# Patient Record
Sex: FEMALE | Race: WHITE | NOT HISPANIC OR LATINO | Employment: FULL TIME | URBAN - METROPOLITAN AREA
[De-identification: names, ages, dates, MRNs, and addresses within clinical notes are randomized per-mention and may not be internally consistent; named-entity substitution may affect disease eponyms.]

---

## 2022-03-14 ENCOUNTER — TELEPHONE (OUTPATIENT)
Dept: SLEEP CENTER | Facility: CLINIC | Age: 57
End: 2022-03-14

## 2022-04-18 ENCOUNTER — OFFICE VISIT (OUTPATIENT)
Dept: SLEEP CENTER | Facility: CLINIC | Age: 57
End: 2022-04-18

## 2022-04-18 VITALS
DIASTOLIC BLOOD PRESSURE: 66 MMHG | HEIGHT: 64 IN | BODY MASS INDEX: 21 KG/M2 | WEIGHT: 123 LBS | HEART RATE: 83 BPM | SYSTOLIC BLOOD PRESSURE: 138 MMHG

## 2022-04-18 DIAGNOSIS — G47.33 OBSTRUCTIVE SLEEP APNEA (ADULT) (PEDIATRIC): ICD-10-CM

## 2022-04-18 DIAGNOSIS — F51.3 SLEEPWALKING (SOMNAMBULISM): ICD-10-CM

## 2022-04-18 DIAGNOSIS — G47.411 NARCOLEPSY WITH CATAPLEXY: Primary | ICD-10-CM

## 2022-04-18 DIAGNOSIS — R41.3 MEMORY LOSS: ICD-10-CM

## 2022-04-18 PROBLEM — Z86.39 HISTORY OF VITAMIN D DEFICIENCY: Status: ACTIVE | Noted: 2020-08-27

## 2022-04-18 PROBLEM — I49.3 PREMATURE VENTRICULAR CONTRACTIONS: Status: ACTIVE | Noted: 2019-04-23

## 2022-04-18 PROBLEM — M54.2 CHRONIC NECK PAIN: Status: ACTIVE | Noted: 2020-08-27

## 2022-04-18 PROBLEM — D12.6 ADENOMATOUS POLYP OF COLON: Status: ACTIVE | Noted: 2019-04-23

## 2022-04-18 PROBLEM — Z14.01 HEMOPHILIA CARRIER: Status: ACTIVE | Noted: 2022-04-18

## 2022-04-18 PROBLEM — M85.88 OSTEOPENIA OF LUMBAR SPINE: Status: ACTIVE | Noted: 2020-08-27

## 2022-04-18 PROBLEM — Z79.899 ON STIMULANT MEDICATION: Status: ACTIVE | Noted: 2020-08-27

## 2022-04-18 PROBLEM — G89.29 CHRONIC NECK PAIN: Status: ACTIVE | Noted: 2020-08-27

## 2022-04-18 PROBLEM — Z79.899 LONG TERM PRESCRIPTION BENZODIAZEPINE USE: Status: ACTIVE | Noted: 2020-08-27

## 2022-04-18 PROBLEM — R04.0 EPISTAXIS: Status: ACTIVE | Noted: 2022-04-18

## 2022-04-18 PROBLEM — R04.0 EPISTAXIS: Status: RESOLVED | Noted: 2022-04-18 | Resolved: 2022-04-18

## 2022-04-18 PROBLEM — M54.12 CERVICAL RADICULOPATHY AT C5: Status: ACTIVE | Noted: 2020-08-27

## 2022-04-18 PROCEDURE — 99215 OFFICE O/P EST HI 40 MIN: CPT | Performed by: PSYCHIATRY & NEUROLOGY

## 2022-04-18 RX ORDER — MULTIVITAMIN WITH FOLIC ACID 400 MCG
1 TABLET ORAL DAILY
COMMUNITY

## 2022-04-18 RX ORDER — CLONAZEPAM 1 MG/1
1 TABLET ORAL DAILY
COMMUNITY
Start: 2022-04-15

## 2022-04-18 RX ORDER — DEXTROAMPHETAMINE SACCHARATE, AMPHETAMINE ASPARTATE, DEXTROAMPHETAMINE SULFATE AND AMPHETAMINE SULFATE 5; 5; 5; 5 MG/1; MG/1; MG/1; MG/1
20 TABLET ORAL 3 TIMES DAILY
COMMUNITY
Start: 2021-11-15 | End: 2022-04-18 | Stop reason: SDUPTHER

## 2022-04-18 RX ORDER — DEXTROAMPHETAMINE SACCHARATE, AMPHETAMINE ASPARTATE, DEXTROAMPHETAMINE SULFATE AND AMPHETAMINE SULFATE 5; 5; 5; 5 MG/1; MG/1; MG/1; MG/1
TABLET ORAL
Qty: 270 TABLET | Refills: 0 | Status: SHIPPED | OUTPATIENT
Start: 2022-04-18 | End: 2022-06-27 | Stop reason: SDUPTHER

## 2022-04-18 RX ORDER — MELOXICAM 15 MG/1
15 TABLET ORAL AS NEEDED
COMMUNITY
Start: 2022-03-20

## 2022-04-18 NOTE — PROGRESS NOTES
Assessment/Plan:      1  Narcolepsy with cataplexy  Assessment & Plan:  Ms Alicja Da Silva has been doing well on her current regimen for the most part  She is able to maintain alertness and does not have side effects on Adderall  We reviewed that if a nonstimulant was effective, that certainly would be ideal from a long-term health perspective  In the past, modafinil did not provide at a benefit, but she has not tried [place] or [place]  We discussed that on off days, she could skip the afternoon dose of Adderall or even lower the morning dose to 30 mg as on the she is able to control sleepiness by napping and also avoiding drowsy driving  I renewed the medication today  She notes a negative workup with Cardiology approximately 5 years ago  Although she is asymptomatic, it would be reasonable for her to have a reassessment to make sure no changes on echo have emerged  She wishes to pursue this closer to home  Orders:  -     amphetamine-dextroamphetamine (ADDERALL) 20 mg tablet; Take 2 pills in the morning and 1 pill in the afternoon  Afternoon dose is as needed    2  Memory loss  Comments:  She has concerns regarding this  Previous brain MRI was negative  I ordered a serum B12 and TSH today  Also recommend Neurology assessment  Orders:  -     Vitamin B12; Future  -     TSH, 3rd generation with T4 reflex; Future  -     Vitamin B12  -     TSH, 3rd generation with T4 reflex    3  Sleepwalking (somnambulism)  Assessment & Plan: On clonazepam which seems mostly effective  Because of sleepwalking, use of Xywav would not be ideal       4  Obstructive sleep apnea (adult) (pediatric)  Comments: This was attached to her referral to me  I do not suspect obstructive sleep apnea nor does she have this condition  Orders:  -     Ambulatory Referral to Sleep Medicine         Subjective:      Patient ID: Lane Fernando is a 64 y o  female  Ms Alicja Da Silva presents to transfer care for her history of narcolepsy and cataplexy    She previously was a patient of mine in [facility]  I last saw her in December 2021, she was doing well at that visit  She has been on a stable dose of Adderall for many years, has been on Adderall since at least 2007  She has no concerns since her last visit  One question she had is if Adderall "would shorten my lifespan"  She had seen cardiology several years ago for palpations and reports a negative workup  These are controlled  Shares a bed with her   Takes clonazepam 1 mg at night  Has not had recent sleepwalking, last happened 1-2 years ago  She has been taking Adderall 40 mg on waking  Then returns to sleep and wakes up after another 30 minutes  At 130-2 PM she takes an extra Adderall 20 mg  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  She reports a bedtime of 10:30 p m , is asleep by 10 40  She is awake at 6:45 a m  Bernadine Console She wakes 3-4 times during the night, sometimes to urinate but returns to sleep easily  She is not aware of snoring, gasping, or witnessed apnea  She has a history of sleep walking, has clonazepam 1 mg prescribed  She drinks 8 oz of caffeinated coffee a day  She drinks alcohol a few times during the week, 1-2 glasses  When she first awakens she is not usually refreshed  Around 11 AM and afternoon she feels sleepy   Has had two times of dozing at work since December  No recent drowsy driving  No sleep paralysis recently  No sleep hallucination recently  These only occur with sleep deprivation  She has rare cataplexy episodes, feels a minor jolt of her muscles caused by laughter or falling asleep  Prior test-  PSG/MSLT at [facility], rem latency 84 min, fragmented sleep, normal AHI  MSLT- mean latency to sleep 3 1 min   REM on 4/5 naps    Previous meds-   1999- presented to Dr Val King on Ritalin, was ineffective  2000- Provigil added, this was stopped between 4026-1015  Has been on Adderall since at least 2007 [place] Sleepiness Scale:     Sitting and reading: Moderate chance of dozing  Watching TV: Moderate chance of dozing  Sitting, inactive in a public place (e g  a theatre or a meeting): Moderate chance of dozing  As a passenger in a car for an hour without a break: High chance of dozing  Lying down to rest in the afternoon when circumstances permit: High chance of dozing  Sitting and talking to someone: Slight chance of dozing  Sitting quietly after a lunch without alcohol: Moderate chance of dozing  In a car, while stopped for a few minutes in traffic: Would never doze  Total score: 15     The following portions of the patient's history were reviewed and updated as appropriate: allergies, current medications, past family history, past medical history, past social history, past surgical history, and problem list     Review of Systems    Genitourinary post menopausal (no peroids)   Cardiology none   Gastrointestinal none   Neurology forgetfulness, poor concentration or confusion,  and difficulty with memory   Constitutional none   Integumentary none   Psychiatry none   Musculoskeletal none   Pulmonary none   ENT none   Endocrine none   Hematological none       Objective:  Neck Circumference: 12 inches      /66 (BP Location: Left arm, Patient Position: Sitting, Cuff Size: Adult)   Pulse 83   Ht 5' 4" (1 626 m)   Wt 55 8 kg (123 lb)   BMI 21 11 kg/m²          Physical Exam  Constitutional:       Appearance: Normal appearance  HENT:      Head: Normocephalic and atraumatic  Mouth/Throat:      Mouth: Mucous membranes are moist    Eyes:      Extraocular Movements: Extraocular movements intact  Pupils: Pupils are equal, round, and reactive to light  Cardiovascular:      Rate and Rhythm: Normal rate  Pulses: Normal pulses  Heart sounds: Normal heart sounds  Pulmonary:      Effort: Pulmonary effort is normal       Breath sounds: Normal breath sounds     Musculoskeletal:      Right lower leg: No edema  Left lower leg: No edema  Neurological:      Mental Status: She is alert  Psychiatric:         Mood and Affect: Mood normal          Behavior: Behavior normal          Thought Content: Thought content normal          Judgment: Judgment normal        I spent 43 minutes on the day of the patient's visit in chart review, face-to-face with the patient, and in documentation

## 2022-04-18 NOTE — PATIENT INSTRUCTIONS
As we discussed, if you want, we can try Wakix or Sunosi as an adjuct with the goal to lower the Adderall dose    It is very important to avoid driving while drowsy, this can be very dangerous or even cause serious injury or death  If sleepy, it is not safe to get behind the wheel  If you are driving and feels sleepy, it is very important to pull over right away  Even losing control of the car for a split second can be deadly  If you feel you cannot control when sleepiness occurs and cannot prevented, it is important to not drive at all until this improves  Please let me know if you experience this as it is very important  I agree it would make sense to see cardiology again to make sure they have no concerns    Also, for your concern regarding memory loss, I would recommend seeing a general neurologist     Also, continue to follow safety measures for sleepwalking

## 2022-04-18 NOTE — ASSESSMENT & PLAN NOTE
On clonazepam which seems mostly effective    Because of sleepwalking, use of Xywav would not be ideal

## 2022-04-18 NOTE — PROGRESS NOTES
Review of Systems      Genitourinary post menopausal (no peroids)   Cardiology none   Gastrointestinal none   Neurology forgetfulness, poor concentration or confusion,  and difficulty with memory   Constitutional none   Integumentary none   Psychiatry none   Musculoskeletal none   Pulmonary none   ENT none   Endocrine none   Hematological none

## 2022-04-18 NOTE — ASSESSMENT & PLAN NOTE
Ms Sravan Russell has been doing well on her current regimen for the most part  She is able to maintain alertness and does not have side effects on Adderall  We reviewed that if a nonstimulant was effective, that certainly would be ideal from a long-term health perspective  In the past, modafinil did not provide at a benefit, but she has not tried [place] or [place]  We discussed that on off days, she could skip the afternoon dose of Adderall or even lower the morning dose to 30 mg as on the she is able to control sleepiness by napping and also avoiding drowsy driving  I renewed the medication today  She notes a negative workup with Cardiology approximately 5 years ago  Although she is asymptomatic, it would be reasonable for her to have a reassessment to make sure no changes on echo have emerged  She wishes to pursue this closer to home

## 2022-06-27 DIAGNOSIS — G47.411 NARCOLEPSY WITH CATAPLEXY: ICD-10-CM

## 2022-06-28 NOTE — TELEPHONE ENCOUNTER
I called the patient to clarify her medication status  I reviewed PDMP which shows that she filled a 90 day supply of Adderall on February 9th and then a 90 day supply of this medication on April 25th  Therefore, I believe she should have at least another month left on the medication, I just want to clarify    I left a message on her voicemail

## 2022-06-29 ENCOUNTER — TELEPHONE (OUTPATIENT)
Dept: SLEEP CENTER | Facility: CLINIC | Age: 57
End: 2022-06-29

## 2022-06-29 NOTE — TELEPHONE ENCOUNTER
----- Message from Perfecto Marie sent at 6/29/2022  6:52 AM EDT -----  Regarding: Adderall Refill  Hi DR Cordova,  Everything is well  I completed my physical with Sánchez Alonzo NP  and had bloodwork done  I have labs still to be drawn that you wanted  I plan to have them done next Thursday or Friday when I am off from work  Would you please send my 3-month refill to my [facility] pharmacy that is in my chart? I don't want to run out of medication  I received your message yesterday and returned your call  I was working and could not take your call while in patient care  Please let me know when you have sent my prescription refill    Thank you so much,  Min Hameed

## 2022-06-30 RX ORDER — DEXTROAMPHETAMINE SACCHARATE, AMPHETAMINE ASPARTATE, DEXTROAMPHETAMINE SULFATE AND AMPHETAMINE SULFATE 5; 5; 5; 5 MG/1; MG/1; MG/1; MG/1
TABLET ORAL
Qty: 270 TABLET | Refills: 0 | Status: SHIPPED | OUTPATIENT
Start: 2022-07-15 | End: 2022-07-01

## 2022-07-29 ENCOUNTER — TELEPHONE (OUTPATIENT)
Dept: SLEEP CENTER | Facility: CLINIC | Age: 57
End: 2022-07-29

## 2022-07-29 DIAGNOSIS — G47.411 NARCOLEPSY WITH CATAPLEXY: Primary | ICD-10-CM

## 2022-07-29 RX ORDER — DEXTROAMPHETAMINE SACCHARATE, AMPHETAMINE ASPARTATE, DEXTROAMPHETAMINE SULFATE AND AMPHETAMINE SULFATE 5; 5; 5; 5 MG/1; MG/1; MG/1; MG/1
TABLET ORAL
Qty: 270 TABLET | Refills: 0 | Status: SHIPPED | OUTPATIENT
Start: 2022-07-29

## 2022-07-29 NOTE — TELEPHONE ENCOUNTER
----- Message from Kaya Farmer sent at 7/29/2022 10:03 AM EDT -----  Regarding: medication  Good Morning Dr Mona Leahy,  Would you please send a new refill for my Adderall prescription? I am getting low and it is due for a refill  I know you sent a refill earlier last month but it wasnt filled because of it being canceled  iIt was too early at that point  My last refill was April 25, 2022  Please send the refill to the [facility] on file, as soon as it is convenient  It takes at least five days for it to arrive and now its the weekend  Please let me know when this gets completed so I can look out for the delivery  I will see you in October, for my follow-up appointment     Thanks for your time ,   Rhea Cornejo  [phone]

## 2022-10-20 ENCOUNTER — OFFICE VISIT (OUTPATIENT)
Dept: SLEEP CENTER | Facility: CLINIC | Age: 57
End: 2022-10-20

## 2022-10-20 VITALS
WEIGHT: 121 LBS | SYSTOLIC BLOOD PRESSURE: 128 MMHG | DIASTOLIC BLOOD PRESSURE: 64 MMHG | HEIGHT: 64 IN | BODY MASS INDEX: 20.66 KG/M2 | HEART RATE: 77 BPM

## 2022-10-20 DIAGNOSIS — G47.411 NARCOLEPSY WITH CATAPLEXY: ICD-10-CM

## 2022-10-20 DIAGNOSIS — F51.3 SLEEPWALKING (SOMNAMBULISM): ICD-10-CM

## 2022-10-20 DIAGNOSIS — G47.33 OBSTRUCTIVE SLEEP APNEA (ADULT) (PEDIATRIC): ICD-10-CM

## 2022-10-20 NOTE — PROGRESS NOTES
Progress Note - Sleep Medicine  Brittany Hendricks 64 y o  female MRN: [id number]       Impression & Plan: Brittany Hendricks is a 80-year-old female past medical history of narcolepsy with cataplexy presents for follow-up of narcolepsy  1  Narcolepsy with cataplexy (Type 1)   Patient has been stable on regimen of Adderall 40 mg in the morning and 20 mg in the afternoon  She takes clonazepam 1 5 mg at night to help her stay asleep   She has questions about any cardiovascular complications of Adderall   Explained that cardiology workup could be beneficial   Denies having recent cataplexy, hallucinations, sleep paralysis episodes   Avoided Xyrem and other similar medications in the past due to history of sleepwalking    Plan   Recommend continuing on current regimen  Counseled patient on avoiding driving while drowsy    2  Sleepwalking  Patient still occasionally sleep walks although it has improved   No longer sleepy eating   Counseled patient on safety measures  Clonazepam seems to have been effective    ______________________________________________________________________    HPI:    Brittany Hendricks is a 80-year-old female past medical history of narcolepsy with cataplexy presents for follow-up of narcolepsy  Patient has been doing well on her current medication regiment of Adderall 40 mg instant release at 6 a m  in the morning and Adderall 20 mg at 2 p m  she has always had difficulty staying asleep even prior to starting Adderall  She has needed medication to help her stay asleep at night  She is currently taking clonazepam 1 5 mg that has helped her sleep  She goes to bed at 10:30 p m  and falls asleep within 5 minutes  She wakes up at 6 a m  Gil Mendoza She averages 7 hours of sleep  She wakes up once or twice at night to use the bathroom but is able to fall back asleep right away  She does not take naps during the day  Prior to starting clonazepam she tried Ativan and Ambien  She had a bad experience with Ambien  She is doing well with clonazepam       She has a history of sleep walking and sleep eating  This has decreased over the years but she still has occasional episodes of sleep walking  She is no longer sleep eating  She has a history of cataplexy which is no longer occurring  She has a history of sleep paralysis and hypnogogic hallucinations  This is also not occurred recently  On the weekends she does not take afternoon dose of Adderall  Review of Systems:  Review of Systems   All other systems reviewed and are negative        Review of Systems      Genitourinary none   Cardiology none   Gastrointestinal none   Neurology difficulty with memory   Constitutional none   Integumentary none   Psychiatry none   Musculoskeletal none   Pulmonary none   ENT none   Endocrine none   Hematological none     Social history updates:  Social History     Tobacco Use   Smoking Status Never Smoker   Smokeless Tobacco Never Used     Social History     Socioeconomic History   • Marital status: /Civil Union     Spouse name: Not on file   • Number of children: Not on file   • Years of education: Not on file   • Highest education level: Not on file   Occupational History   • Not on file   Tobacco Use   • Smoking status: Never Smoker   • Smokeless tobacco: Never Used   Substance and Sexual Activity   • Alcohol use: Not on file   • Drug use: Not on file   • Sexual activity: Not on file   Other Topics Concern   • Not on file   Social History Narrative   • Not on file     Social Determinants of Health     Financial Resource Strain: Not on file   Food Insecurity: Not on file   Transportation Needs: Not on file   Physical Activity: Not on file   Stress: Not on file   Social Connections: Not on file   Intimate Partner Violence: Not on file   Housing Stability: Not on file       PhysicalExamination:  Vitals:   /64 (BP Location: Left arm, Patient Position: Sitting, Cuff Size: Adult)   Pulse 77   Ht 5' 4" (1 626 m)   Wt 54 9 kg (121 lb)   BMI 20 77 kg/m²     Physical Exam  General: Pleasant, Awake alert and oriented x 3, conversant without conversational dyspnea, NAD, normal affect  HEENT:  PERRL, Sclera noninjected, nonicteric OU, Nares patent,  no craniofacial abnormalities, Mucous membranes, moist, no oral lesions, normal dentition, Mallampati class 2  NECK:  Trachea midline, no accessory muscle use, no stridor, no cervical or supraclavicular adenopathy, JVP not elevated  CARDIAC: Reg, single s1/S2, no m/r/g  PULM: CTA bilaterally no wheezing, rhonchi or rales  EXT: No cyanosis, no clubbing, no edema, normal capillary refill  NEURO: no focal neurologic deficits, AAOx3, moving all extremities appropriately    Diagnostic Data:  Labs: I personally reviewed the most recent laboratory data pertinent to today's visit  not applicable    I have personally reviewed pertinent lab results    No results found for: WBC, HGB, HCT, MCV, PLT  No results found for: GLUCOSE, CALCIUM, NA, K, CO2, CL, BUN, CREATININE  No results found for: IGE  No results found for: ALT, AST, GGT, ALKPHOS, BILITOT  No results found for: IRON, TIBC, FERRITIN  No results found for: [id number]  No results found for: FOLATE      Arterial Blood Gas result:  NA    Sleep studies:  Diagnostic:  Titration:  Split:                                                                  [address] [place] [address] Sleep Medicine Fellow

## 2022-11-10 DIAGNOSIS — G47.411 NARCOLEPSY WITH CATAPLEXY: ICD-10-CM

## 2022-11-10 RX ORDER — DEXTROAMPHETAMINE SACCHARATE, AMPHETAMINE ASPARTATE, DEXTROAMPHETAMINE SULFATE AND AMPHETAMINE SULFATE 5; 5; 5; 5 MG/1; MG/1; MG/1; MG/1
TABLET ORAL
Qty: 270 TABLET | Refills: 0 | Status: SHIPPED | OUTPATIENT
Start: 2022-11-10

## 2022-11-10 NOTE — TELEPHONE ENCOUNTER
4/20/2023 next appointment     Nicolas Mathews  to Bess Barnhart MD    [facility]      11/9/22 1:55 PM  Hi Dilip Nichols ,  Could you please send my 3 month refill for my Adderall prescription asap  The [facility] is in my file    I appreciate your help      Thank you very much,  Keyla Ramires

## 2023-02-13 DIAGNOSIS — G47.411 NARCOLEPSY WITH CATAPLEXY: ICD-10-CM

## 2023-02-13 RX ORDER — DEXTROAMPHETAMINE SACCHARATE, AMPHETAMINE ASPARTATE, DEXTROAMPHETAMINE SULFATE AND AMPHETAMINE SULFATE 5; 5; 5; 5 MG/1; MG/1; MG/1; MG/1
TABLET ORAL
Qty: 270 TABLET | Refills: 0 | Status: SHIPPED | OUTPATIENT
Start: 2023-02-13

## 2023-02-13 NOTE — TELEPHONE ENCOUNTER
Patient left message requesting 90-day refill of amphetamine-dextroamphetamine (ADDERALL, 20MG,) 20 mg tablet    Asking to send to [facility]  Called patient to clarify pharmacy  Last script on 11/10/22 was sent to [facility] at [place] [id number] in [facility]  Patient states she was able to get 90-day supply at local pharmacy last time  Would like script sent to same pharmacy  If there is any problems getting the 90-day supply she will call back and change to the [facility]  Dr Lupe Burgos, please review Rx and sign if appropriate    Send to [facility] in [place], [place]

## 2023-03-10 LAB
TSH SERPL DL<=0.005 MIU/L-ACNC: 1.35 UIU/ML (ref 0.45–4.5)
VIT B12 SERPL-MCNC: 242 PG/ML (ref 232–1245)

## 2023-05-04 ENCOUNTER — TELEPHONE (OUTPATIENT)
Dept: SLEEP CENTER | Facility: CLINIC | Age: 58
End: 2023-05-04

## 2023-05-04 DIAGNOSIS — G47.411 NARCOLEPSY WITH CATAPLEXY: ICD-10-CM

## 2023-05-04 RX ORDER — DEXTROAMPHETAMINE SACCHARATE, AMPHETAMINE ASPARTATE, DEXTROAMPHETAMINE SULFATE AND AMPHETAMINE SULFATE 5; 5; 5; 5 MG/1; MG/1; MG/1; MG/1
TABLET ORAL
Qty: 270 TABLET | Refills: 0 | Status: SHIPPED | OUTPATIENT
Start: 2023-05-04

## 2023-05-04 RX ORDER — DEXTROAMPHETAMINE SACCHARATE, AMPHETAMINE ASPARTATE, DEXTROAMPHETAMINE SULFATE AND AMPHETAMINE SULFATE 5; 5; 5; 5 MG/1; MG/1; MG/1; MG/1
TABLET ORAL
Qty: 270 TABLET | Refills: 0 | Status: SHIPPED | OUTPATIENT
Start: 2023-05-04 | End: 2023-05-04 | Stop reason: SDUPTHER

## 2023-05-04 NOTE — TELEPHONE ENCOUNTER
----- Message from Jared Bonds sent at 5/4/2023  9:38 AM EDT -----  Regarding: Appointment Scheduling  Contact: [phone]  Hi Dr Fred Quispe,  I could not make my last appointment with you because my work schedule changed and I had to go to work that day  The next appointment I could schedule with you is in September and I will have to take a vacation day  Do you schedule (video) telehealth appointments? I wanted to let you know that I followed up with PCP about the low B-12  I had a blood work-up and everything came back normal  The B-12 was still on the low normal side  My PCP said to add a B-12 supplement to my diet  I have had no new problems to report or changes with my narcolepsy  I have been successfully trying to omit my afternoon dose of Adderall 3 times a week  My sleeping at night has been improved by taking a very small dose of Seroquel at bedtime  I split a 25 mg tablet in 4 and take a quarter pill  I will have repeat labs taken at my yearly physical in June  At present, I need a 90-day refill of Adderall  I left a voicemail with my information with the nurse    Thank you,  Jason Waters

## 2023-09-12 ENCOUNTER — OFFICE VISIT (OUTPATIENT)
Dept: SLEEP CENTER | Facility: CLINIC | Age: 58
End: 2023-09-12
Payer: COMMERCIAL

## 2023-09-12 VITALS
SYSTOLIC BLOOD PRESSURE: 118 MMHG | DIASTOLIC BLOOD PRESSURE: 83 MMHG | HEART RATE: 78 BPM | BODY MASS INDEX: 20.77 KG/M2 | HEIGHT: 64 IN

## 2023-09-12 DIAGNOSIS — G47.33 OBSTRUCTIVE SLEEP APNEA (ADULT) (PEDIATRIC): ICD-10-CM

## 2023-09-12 DIAGNOSIS — F51.3 SLEEPWALKING (SOMNAMBULISM): ICD-10-CM

## 2023-09-12 DIAGNOSIS — G47.411 NARCOLEPSY WITH CATAPLEXY: ICD-10-CM

## 2023-09-12 PROCEDURE — 99213 OFFICE O/P EST LOW 20 MIN: CPT | Performed by: PSYCHIATRY & NEUROLOGY

## 2023-09-12 RX ORDER — CYCLOBENZAPRINE HCL 5 MG
TABLET ORAL
COMMUNITY
Start: 2023-07-20

## 2023-09-12 RX ORDER — QUETIAPINE FUMARATE 25 MG/1
TABLET, FILM COATED ORAL
COMMUNITY
Start: 2023-08-08

## 2023-09-12 NOTE — PROGRESS NOTES
Progress Note - Sleep Medicine  Kristen Dubon 62 y.o. female MRN: [id number]    Impression & Plan: Kristen Dubon presents today for follow-up of narcolepsy with cataplexy, sleep maintenance insomnia, and sleepwalking. Last seen on 10/20/2022. Overall her narcolepsy with cataplexy symptoms are stable on Adderall 40 mg in the morning and 20 mg in the afternoon. As she is currently working, patient defers any change in her medication regimen which could impact her alertness. Denies any recent sleepwalking, weaned off clonazepam 1 mg qhs in conjunction with PCP without any issues. Sleep maintenance insomnia (frequent overnight awakenings), improved with Seroquel 6.25-12.5 mg qhs (prescribed by PCP). Refilled Adderall prescription today. Follow-up in 6 months. Type I Narcolepsy (Narcolepsy with cataplexy)  -Symptoms stable on her current medication regimen. Her symptoms started when she was 22years-old. Endorses 1 episode of cataplexy in 2023 without injury  -PSG/MSLT at the [address] [place] [address] in 1992 - PSG - REM latency 84 min, fragmented sleep, and a normal AHI. MSLT- mean sleep latency of 3.1 minutes and REM on 4/5 naps  -Continue Adderall 40 mg in the morning (6 AM) and 20 mg in the afternoon (2 PM, takes ~3-4 times per week). Refilled this prescription today  -Denies any side effects of Adderall or issues with her BP. Recent TTE (within the past 5 years) was unremarkable  -Prior had been on Ritalin (~1999) and Provigil (~1785-1140) without improvement in her narcolepsy symptoms  -A potential treatment strategy would be to start Wakix and decrease Adderall as tolerated. As she is currently working, patient defers any change in her medication regimen which could impact her alertness  -Avoid Xyrem and other similar medications due to history of sleepwalking   -Counseled patient on avoiding driving while drowsy    EDS  -[place] of 17 today.  [place] of 15 on 4/18/2022  -Likely secondary to Type I Narcolepsy (Narcolepsy with cataplexy) - treatment as above. -Vitamin B12 242 and TSH 1.35 in 3/2023. Recommend MMA serum level and vitamin B12 supplementation (goal level >400) in conjunction with PCP  -No current clinical suspicion for NOAH     Sleep maintenance insomnia  -Sleep onset latency ~5 minutes  -Awakens ~2-3 times per night (after sleeping ~2-3 hours at a time)  -Improved with Seroquel 6.25-12.5 mg qhs (prescribed by PCP), sleeps ~3:30 hours at a time  -Denies snoring, witnessed apneas, or gasping/choking for air. Would consider repeat sleep study if she were to develop any of these symptoms    Sleepwalking  -Weaned off clonazepam 1 mg qhs in conjunction with PCP without any issues  -Denies any sleepwalking since 10/2022  -Counseled patient on safety measures    Follow-up in 6 months    Staffed and seen with Dr. Mayela Madison on 9/12/2023  ______________________________________________________________________    HPI:    Gomez Bonilla presents today for follow-up of narcolepsy with cataplexy, sleep maintenance insomnia, and sleepwalking. Last seen on 10/20/2022. Since last appointment, she weaned clonazepam 1 mg qhs in conjunction with her PCP without issue or change in her sleep. Prescribed Seroquel 25 mg qhs in 8/8/2023 for insomnia. She takes ~6.25-12.5 mg qhs with improvement in her sleep quantity and quality. Vitamin B12 242 and TSH 1.35 in 3/2023. Denies any change in weight. Denies any significant change in PMH, PSH, medications, or allergies. On evaluation, patient is sitting in chair and in no acute distress. Sleep history as below. She currently takes Adderall 40 mg qAM (6 AM) and 20 mg qPM (~2 PM, ~3-4 times per week (during the work week), does not take on the weekend). She has been on Adderall since ~2007. Denies side effects such as chest pain, palpitations, weight loss, decreased appetite, or headaches. Denies any issues with her BP and that a recent TTE (within the past 5 years) was unremarkable.  Prior had been on Ritalin (~1999) and Provigil (~9478-2760) without improvement in her narcolepsy symptoms. History of cataplexy, sleep paralysis, hypnagogic hallucinations, and sleep attacks (since age ~19 years-old) which are triggered by sleep deprivation. Endorses ~1 episode of cataplexy in 2023. Denies any recent sleepwalking. She takes ~6.25-12.5 mg qhs with improvement in her sleep quantity and quality. With Seroquel she can consecutively sleep ~3:30 hours, while without Seroquel she can only sleep ~2-3 hours at a time. Denies involuntary movements or spasms. Denies SOB, wheezing, chest pain, peripheral edema, neuropathy, or falls. Denies snoring, witnessed apneas, or gasping/choking for air. Sleep Schedule and Sleep Hygiene:  Patient reports problems with sleep: no current sleep concerns  Work history: Yes, pediatric oncologist RN in [employer]   Work hours: 8:30 AM-5:30 PM  Shift work: denies   Living situation: Lives with  and 13year-old daughter    Bed Time: 10 PM  Lights Out: 10 PM  Sleep Onset Latency: ~5 minutes  Frequency of Night-time Awakenings: ~2-3 per night (awakenings every ~3.5 hours with Seroquel (~2-3 hours without Seroquel))  Wake Time: 6 AM  Rise Time: 6 AM  Days off schedule: Bed time of 11-12 PM, sleep latency ~5 minutes, and wake/rise time of 8 AM  Naps: denies   Total Sleep Time: ~6-7 hours    Sleep medications: Seroquel 6.25-12.5 mg qhs. Used Ambien once which caused worsening of her sleepwalking.  Imipramine used in the past without improvement in her sleep   Caffeine use: yes, 1 cup of coffee in the AM  Alcohol use: denies  Cigarettes: denies  Illicit drug use: denies     Other sleep related behaviors and symptoms:   Parasomnias: denies, history of sleepwalking  Sleep attacks: yes, not in many years while on Adderall   Hypnagogic hallucinations: yes, not in many years while on Adderall   Sleep attacks; yes, not in many years while on Adderall   Cataplexy: yes, 1 episode in 2023  REM Behavioral Sleep Disorder: denies    Daytime Symptoms:   Excessive daytime sleepiness: yes, well-controlled with current Adderall dosing   Driving while drowsy: denies  History of motor vehicle accidents or near misses: denies  Cognitive problems: denies  Mood problems: denies  Problems at work, school or at home: denies    [place]:  Sitting and reading: Moderate chance of dozing  Watching TV: High chance of dozing  Sitting, inactive in a public place (e.g. a theatre or a meeting): Moderate chance of dozing  As a passenger in a car for an hour without a break: High chance of dozing  Lying down to rest in the afternoon when circumstances permit: High chance of dozing  Sitting and talking to someone: Slight chance of dozing  Sitting quietly after a lunch without alcohol:  Moderate chance of dozing  In a car, while stopped for a few minutes in traffic: Slight chance of dozing  Total score: 17    [place] of 15 on 4/18/2022    Social history updates:  Social History     Tobacco Use   Smoking Status Never   Smokeless Tobacco Never     Social History     Socioeconomic History   • Marital status: /Civil Union     Spouse name: Not on file   • Number of children: Not on file   • Years of education: Not on file   • Highest education level: Not on file   Occupational History   • Not on file   Tobacco Use   • Smoking status: Never   • Smokeless tobacco: Never   Substance and Sexual Activity   • Alcohol use: Not on file   • Drug use: Not on file   • Sexual activity: Not on file   Other Topics Concern   • Not on file   Social History Narrative   • Not on file     Social Determinants of Health     Financial Resource Strain: Not on file   Food Insecurity: Not on file   Transportation Needs: Not on file   Physical Activity: Not on file   Stress: Not on file   Social Connections: Not on file   Intimate Partner Violence: Not on file   Housing Stability: Not on file     PhysicalExamination:  Vitals:   /83 (BP Location: Left arm, Patient Position: Sitting, Cuff Size: Adult)   Pulse 78   Ht 5' 4" (1.626 m)   BMI 20.77 kg/m²     Physical Exam:  General: Sitting in chair, awake alert and oriented to person, place, and time. No acute distress  HEENT: No craniofacial abnormalities  NECK: Trachea midline, no accessory muscle use, and no stridor   CARDIAC: Regular rate and rhythm  PULM: CTA bilaterally no wheezing, rhonchi or rales. No conversational dyspnea  EXT: No peripheral edema    NEURO: No focal neurologic deficits, moving all extremities appropriately, light touch intact in bilateral LE, and bilateral finger-to-nose intact without dysmetria    Diagnostic Data:  Labs: I personally reviewed the most recent laboratory data pertinent to today's visit  No visits with results within 6 Month(s) from this visit. Latest known visit with results is:   Office Visit on 04/18/2022   Component Date Value   • Vitamin B-12 03/09/2023 242    • TSH 03/09/2023 1.350      I have personally reviewed pertinent lab results. No results found for: "WBC", "HGB", "HCT", "MCV", "PLT"  No results found for: "GLUCOSE", "CALCIUM", "NA", "K", "CO2", "CL", "BUN", "CREATININE"  No results found for: "IGE"  No results found for: "ALT", "AST", "GGT", "ALKPHOS", "BILITOT"  No results found for: "IRON", "TIBC", "FERRITIN"  Lab Results   Component Value Date    [id number] 242 03/09/2023     No results found for: "FOLATE"    Sleep studies:  PSG/MSLT at the [address] [place] [address] in 1992 - PSG - REM latency 84 min, fragmented sleep, and a normal AHI.  MSLT- mean sleep latency of 3.1 minutes and REM on 4/5 naps    Natalie Ballesteros DO  [facility] Sleep Medicine Fellow

## 2023-09-12 NOTE — PATIENT INSTRUCTIONS
Nursing Support:  When: Monday through Friday 7A-5PM except holidays  Where: Our direct line is [phone]. If you are having a true emergency please call 911. In the event that the line is busy or it is after hours please leave a voice message and we will return your call. Please speak clearly, leaving your full name, birth date, best number to reach you and the reason for your call. Medication refills: We will need the name of the medication, the dosage, the ordering provider, whether you get a 30 or 90 day refill, and the pharmacy name and address. Medications will be ordered by the provider only. Nurses cannot call in prescriptions. Please allow 7 days for medication refills. Physician requested updates: If your provider requested that you call with an update after starting medication, please be ready to provide us the medication and dosage, what time you take your medication, the time you attempt to fall asleep, time you fall asleep, when you wake up, and what time you get out of bed. Sleep Study Results: We will contact you with sleep study results and/or next steps after the physician has reviewed your testing. Sleep hygiene tips:    Keep a consistent bedtime and wake up time. This will lead to a more regular sleep schedule and avoid periods of sleep deprivation or periods of extended wakefulness during the night. Avoid watching TV in bed. If needing a form of media to help with sleep - can try sleep aid podcasts such Sleep With Me - a free podcast that helps with sleep initiation    Avoid napping, especially naps lasting longer than 1 hour or naps late in the day, which will likely affect your ability to fall asleep that night. Limit caffeine, avoiding caffeine after lunch to allow it to get out of your system and not affect your ability to fall asleep or the quality of your sleep.   I usually recommend avoiding caffeine at least 6 hours before bedstime    Limit alcohol, alcohol can be sedating but also activating as it metabolizes, causing you to awaken from sleep earlier than desired. It can affect the quality of your sleep by not letting you get into the more refreshing stages of sleep. Avoid nicotine, of course not smoking or vaping at all is best, but nicotine is a stimulant and should be avoided near bedtime and during the night    Exercise and daytime physical activity is encouraged, in particular 4-6 hours before bedtime, as this may help you to fall asleep more easily and quality of sleep is improved. Rigorous exercise within 3 hours of bedtime is discouraged. Keep the sleep environment quiet and dark - Noise and light exposure during the night can disrupt sleep. White noise or ear plugs are often recommended to reduce noise. Using black out shades or an eye mask is commonly recommended to reduce light. This also includes avoiding exposure to television or technology near bedtime, as this can have an impact on circadian rhythms by shifting sleep time later. Bedroom clock - Avoid checking the time at night  This includes alarm clocks and other time pieces such as watches and phones. Checking the time increases cognitive arousal and prolongs wakefulness. Evening eating - Avoid a large meal near bedtime, but don't go to bed hungry. Eat a healthy and filling meal in the evening without over-eating and avoid late night snacks. Insomnia tips: With great difficulty falling asleep or with difficulty falling back asleep, laying in bed for a prolonged period time is not ideal.  This can increase worry and rumination (having racing thoughts, not able to "turn off your brain". After what feels like 15 minutes, if you feel wide awake, worried, anxious, or can't clear your brain, it is better to leave the bedroom to do something relaxing , The typical recommendation is to read a boring book in dim light.   In general, if you leave the room, you want to do something that is relaxing, not stimulating. Avoid bright screens, doing work, doing chores, or anything that requires a lot of mental effort. Return to bed when you feel more calm, sleepy, or mentally clear. It is common in insomnia to look at the time at night to see what time it is, how long you have been awake, etc.  However, this can negatively affect sleep. I strongly recommend avoiding looking at the time at night. Here are some ways to do this  1) Turn the clock around so you cannot see the time at night  2) Avoid wearing a watch to bed. 3) Leave your phone on the other side of the room so you cannot look at it at night  4) Set an alarm if you need to wake up in he morning. If you have not heard the alarm, assume it is still time to sleep. If you practice this consistently, sleep quality and anxiety regarding sleep may improve. There are some on-line resources that do require a fee that can be of help. Some of which will require a fee. https://acevedo.RMDMgroup/. va.gov/apps/insomnia/index.html#dashboard (FREE)  Http://Swapdom/cbt-online-insomnia-treatment.html  IndoorTheaters.si    Some apps that have helped people sleep: Insomnia  (FREE)  CBT-I   Go! To Sleep by the [facility] [employer]     It is very important to avoid driving while drowsy, this can be very dangerous or even cause serious injury or death. If sleepy, it is not safe to get behind the wheel. If you are driving and feels sleepy, it is very important to pull over right away. Even losing control of the car for a split second can be deadly. If you feel you cannot control when sleepiness occurs and cannot prevented, it is important to not drive at all until this improves. Please let me know if you experience this as it is very important.

## 2023-09-15 DIAGNOSIS — G47.411 NARCOLEPSY WITH CATAPLEXY: ICD-10-CM

## 2023-09-15 RX ORDER — DEXTROAMPHETAMINE SACCHARATE, AMPHETAMINE ASPARTATE, DEXTROAMPHETAMINE SULFATE AND AMPHETAMINE SULFATE 5; 5; 5; 5 MG/1; MG/1; MG/1; MG/1
TABLET ORAL
Qty: 270 TABLET | Refills: 0 | Status: SHIPPED | OUTPATIENT
Start: 2023-09-15

## 2023-09-15 NOTE — TELEPHONE ENCOUNTER
Next appointment is 3/19/2024    Nisha Dennis,   I don’t see that my refill prescription for adderall has been sent to [address] Would you please send the refill to them asap? I will be out of medication soon and it takes up to a week for them to deliver it.    Thank you ,   Natalia Wilkinson

## 2023-12-20 DIAGNOSIS — G47.411 NARCOLEPSY WITH CATAPLEXY: ICD-10-CM

## 2023-12-20 RX ORDER — DEXTROAMPHETAMINE SACCHARATE, AMPHETAMINE ASPARTATE, DEXTROAMPHETAMINE SULFATE AND AMPHETAMINE SULFATE 5; 5; 5; 5 MG/1; MG/1; MG/1; MG/1
TABLET ORAL
Qty: 270 TABLET | Refills: 0 | Status: SHIPPED | OUTPATIENT
Start: 2023-12-20

## 2023-12-20 NOTE — TELEPHONE ENCOUNTER
Last office visit 9/12/2023.  Next office visit 3/19/2024.    Dr. Cordova, please review Rx and sign if agreeable.  Thank you.

## 2024-03-19 ENCOUNTER — OFFICE VISIT (OUTPATIENT)
Dept: SLEEP CENTER | Facility: CLINIC | Age: 59
End: 2024-03-19
Payer: COMMERCIAL

## 2024-03-19 VITALS
BODY MASS INDEX: 21.51 KG/M2 | HEIGHT: 64 IN | DIASTOLIC BLOOD PRESSURE: 68 MMHG | SYSTOLIC BLOOD PRESSURE: 126 MMHG | HEART RATE: 75 BPM | WEIGHT: 126 LBS

## 2024-03-19 DIAGNOSIS — G47.33 OBSTRUCTIVE SLEEP APNEA (ADULT) (PEDIATRIC): ICD-10-CM

## 2024-03-19 DIAGNOSIS — F51.3 SLEEPWALKING (SOMNAMBULISM): ICD-10-CM

## 2024-03-19 DIAGNOSIS — G47.411 NARCOLEPSY WITH CATAPLEXY: ICD-10-CM

## 2024-03-19 PROCEDURE — 99214 OFFICE O/P EST MOD 30 MIN: CPT | Performed by: PSYCHIATRY & NEUROLOGY

## 2024-03-19 RX ORDER — FLUCONAZOLE 200 MG/1
TABLET ORAL
COMMUNITY
Start: 2024-02-12 | End: 2024-03-19

## 2024-03-19 RX ORDER — ESTRADIOL 0.1 MG/G
CREAM VAGINAL
COMMUNITY
Start: 2024-02-12

## 2024-03-19 RX ORDER — DEXTROAMPHETAMINE SACCHARATE, AMPHETAMINE ASPARTATE, DEXTROAMPHETAMINE SULFATE AND AMPHETAMINE SULFATE 5; 5; 5; 5 MG/1; MG/1; MG/1; MG/1
TABLET ORAL
Qty: 270 TABLET | Refills: 0 | Status: SHIPPED | OUTPATIENT
Start: 2024-03-19

## 2024-03-19 RX ORDER — PYRIDOXINE HCL (VITAMIN B6) 50 MG
TABLET ORAL
COMMUNITY

## 2024-03-19 NOTE — PROGRESS NOTES
Assessment/Plan:    1. Narcolepsy with cataplexy  -     Ambulatory Referral to Sleep Medicine  -     amphetamine-dextroamphetamine (ADDERALL, 20MG,) 20 mg tablet; Take 1.5-2 pills in the morning and 0.5-1 pill in the afternoon.  Afternoon dose is as needed    2. Obstructive sleep apnea (adult) (pediatric)  -     Ambulatory Referral to Sleep Medicine    3. Sleepwalking (somnambulism)  -     Ambulatory Referral to Sleep Medicine      Anjali Banda is stable on her current dosing of Adderall which she has used for many years.  Her goal is to minimize use of medication which I agree with.  For example, I agree with her plan to use half of a pill of Adderall in the afternoon as needed, whenever possible.  We discussed that she could try lowering her dose of Adderall in the morning to 30 mg as opposed to 40 mg and see how she functions.  If the lower dose is effective then certainly I would support that reduction.    We discussed that potentially adding Wakix could be helpful and also lowering her daily dose of Adderall.  She is trepidation to make this change as she would not be able to take time off from work and would not want to alter her work schedule.  I asked her to look into Wakix, we can consider this in the future.  I suspect if Wakix was used, she would still require Adderall but hopefully at a lower dose.  At many visit, she is asked me of chronic Adderall use will affect her overall health/lifespan.  She had previously seen cardiology many years ago, we discussed she could see them for another assessment to get their perspective.  That said she does not describe any concerning symptoms.    She has a history of sleepwalking and eating in sleep.  We discussed that for frequent eating and sleep, medication such as topiramate could be used, she would rather avoid medication at this time.  Previously clonazepam did not provide any benefit.  She follow safety measures at night and notes that the symptoms have been  longstanding.,  She does not have much concern regarding these.    A refill for Adderall was sent to the pharmacy, there were no issues on PDMP.  She knows this is a controlled substance and should only use as prescribed.  She has been very responsible with this medication over the years    Subjective:      Patient ID: Anjali Rolon is a 58 y.o. female.    HPI    This is a 58-year-old female this is a 58-year-old female who returns in a follow-up visit.  I last saw her September 2023.  She was initially diagnosed with narcolepsy/cataplexy at [facility] in 1992, she had a mean latency to sleep of 3.1 minutes and REM sleep on 4 out of 5 nap attempts on a PSG/MSLT at that time.  She has been treated with Adderall for many years, with a dose of 40 mg in the morning and 20 mg in the afternoon as needed.    Anjali Banda takes Adderall 40 mg in the AM at 6 AM. Takes 10-20 mg by 2 pm, 3-4 days a week.  Often 10 mg in the afternoon is sufficient.  She has been on this regimen for many years    She has not had drowsy driving.  Notes she would pull over if sleepy.  No issues driving to/from work.  Does not doze at work.  Does not take naps    In past 6 months had one episode of sleep paralysis/sleep hallucinations.  No cataplexy.      Most nights in bed  pm.  Takes quetiapine, does not take long to fall asleep.  Wakes 3 times a night, returns to sleep easily.  Wakes at 6-620 AM  When not working, wakes 8 AM     No health changes    Has had some sleepwalking/night eating at night.  She is awake and knows what she  is doing. No injury with sleepwalking episodes.  No driving in sleep.   Eats in sleep 1-2 times a week.     No snoring, RLS, or gasping in sleep. No dream enactment     [place] Sleepiness Scale  Sitting and reading: Moderate chance of dozing  Watching TV: Moderate chance of dozing  Sitting, inactive in a public place (e.g. a theatre or a meeting): Moderate chance of dozing  As a passenger in a car for an  "hour without a break: Moderate chance of dozing  Lying down to rest in the afternoon when circumstances permit: Moderate chance of dozing  Sitting and talking to someone: Moderate chance of dozing  Sitting quietly after a lunch without alcohol: Moderate chance of dozing  In a car, while stopped for a few minutes in traffic: Slight chance of dozing (she describes that this does not happen however)  Total score: 15      Review of Systems    Denies palpitations, headaches, nervousness, loss of appetite, weight loss    Objective:      /68 (BP Location: Left arm, Patient Position: Sitting, Cuff Size: Large)   Pulse 75   Ht 5' 4\" (1.626 m)   Wt 57.2 kg (126 lb)   BMI 21.63 kg/m²          Physical Exam    RRR  Lungs CTA b/l  "

## 2024-03-19 NOTE — PATIENT INSTRUCTIONS
I would recommend looking into Wakix.  That may be worth looking into as a goal to lower Adderall dose    It is very important to avoid driving while drowsy, this can be very dangerous or even cause serious injury or death.  If sleepy, it is not safe to get behind the wheel.  If you are driving and feels sleepy, it is very important to pull over right away.  Even losing control of the car for a split second can be deadly.  If you feel you cannot control when sleepiness occurs and cannot prevented, it is important to not drive at all until this improves.  Please let me know if you experience this as it is very important.     Continue to follow safety measures to prevent injury in sleep.      Nursing Support:  When: Monday through Friday 7A-5PM except holidays  Where: Our direct line is [phone].    If you are having a true emergency please call 911.  In the event that the line is busy or it is after hours please leave a voice message and we will return your call.  Please speak clearly, leaving your full name, birth date, best number to reach you and the reason for your call.   Medication refills: We will need the name of the medication, the dosage, the ordering provider, whether you get a 30 or 90 day refill, and the pharmacy name and address.  Medications will be ordered by the provider only.  Nurses cannot call in prescriptions.  Please allow 7 days for medication refills.  Physician requested updates: If your provider requested that you call with an update after starting medication, please be ready to provide us the medication and dosage, what time you take your medication, the time you attempt to fall asleep, time you fall asleep, when you wake up, and what time you get out of bed.  Sleep Study Results: We will contact you with sleep study results and/or next steps after the physician has reviewed your testing.

## 2024-08-06 DIAGNOSIS — G47.411 NARCOLEPSY WITH CATAPLEXY: ICD-10-CM

## 2024-08-07 RX ORDER — DEXTROAMPHETAMINE SACCHARATE, AMPHETAMINE ASPARTATE, DEXTROAMPHETAMINE SULFATE AND AMPHETAMINE SULFATE 5; 5; 5; 5 MG/1; MG/1; MG/1; MG/1
TABLET ORAL
Qty: 270 TABLET | Refills: 0 | Status: SHIPPED | OUTPATIENT
Start: 2024-08-07

## 2024-08-08 NOTE — TELEPHONE ENCOUNTER
Called patient to reschedule 2/17/25 follow up.  Dr. Cordova requests a 6-month follow up appointment.  Last office visit was 3/19/24.    Offered to schedule first available on 10/10/24 but patient declined.  States her work schedule has already been set.  She would need to schedule on a Thursday after 11/9/24.    Scheduled follow up 11/14/24.  Patient states that if Dr. Cordova needs her to be seen sooner, it would have to be a virtual visit due to her work schedule.

## 2024-11-14 ENCOUNTER — OFFICE VISIT (OUTPATIENT)
Dept: SLEEP CENTER | Facility: CLINIC | Age: 59
End: 2024-11-14
Payer: COMMERCIAL

## 2024-11-14 VITALS
SYSTOLIC BLOOD PRESSURE: 117 MMHG | DIASTOLIC BLOOD PRESSURE: 72 MMHG | BODY MASS INDEX: 21.85 KG/M2 | WEIGHT: 128 LBS | HEIGHT: 64 IN | HEART RATE: 75 BPM

## 2024-11-14 DIAGNOSIS — G47.411 NARCOLEPSY WITH CATAPLEXY: ICD-10-CM

## 2024-11-14 DIAGNOSIS — F51.3 SLEEPWALKING (SOMNAMBULISM): ICD-10-CM

## 2024-11-14 PROCEDURE — 99214 OFFICE O/P EST MOD 30 MIN: CPT | Performed by: PSYCHIATRY & NEUROLOGY

## 2024-11-14 RX ORDER — DEXTROAMPHETAMINE SACCHARATE, AMPHETAMINE ASPARTATE, DEXTROAMPHETAMINE SULFATE AND AMPHETAMINE SULFATE 5; 5; 5; 5 MG/1; MG/1; MG/1; MG/1
TABLET ORAL
Qty: 235 TABLET | Refills: 0 | Status: SHIPPED | OUTPATIENT
Start: 2024-11-14

## 2024-11-14 NOTE — ASSESSMENT & PLAN NOTE
-Longstanding, had not responded to clonazepam.  -Will continue to observe, she will follow safety measures  Orders:    Ambulatory Referral to Sleep Medicine

## 2024-11-14 NOTE — PROGRESS NOTES
Name: Anjali Rolon      : 1965      MRN: [id number]  Encounter Provider: Ryder Cordova MD  Encounter Date: 2024   Encounter department: [facility]    :  Assessment & Plan  Narcolepsy with cataplexy  -Stable on her current regimen, Adderall is effective  I agree with limiting use of Adderall, if possible.  As she does not reliably need the afternoon dose, we will reduce the number of pills to 235 every 3 months  -No recent  -I reviewed PDMP, there are no concerning entries.  I sent a refill of medication today    Orders:    Ambulatory Referral to Sleep Medicine    amphetamine-dextroamphetamine (ADDERALL, 20MG,) 20 mg tablet; Take 1.5-2 pills in the morning and 0.5-1 pill in the afternoon.  Afternoon dose is as needed    Sleepwalking (somnambulism)  -Longstanding, had not responded to clonazepam.  -Will continue to observe, she will follow safety measures  Orders:    Ambulatory Referral to Sleep Medicine      History of Present Illness     HPI            Ms Rolon returns for a follow-up visit due to history of narcolepsy and cataplexy, last seen by me 8 months ago.  She was initially diagnosed with narcolepsy/cataplexy at [facility] in , she had a mean latency to sleep of 3.1 minutes and REM sleep on 4 out of 5 nap attempts on a PSG/MSLT at that time. She has been treated with Adderall for many years, with a dose of 40 mg in the morning and 20 mg in the afternoon as needed.     Overall she is doing well and happy with her treatment.  She tries to limit use of Adderall and often skips the afternoon dose  Takes Adderall 40 mg at 630 am; can last through the day. Maybe a little sleepy after a late lunch.  She will use a dose of Adderall in the afternoon on an as-needed basis.  Rarely naps after  works, she does not doze, denies drowsy driving.    Goes to bed  pm  Up 6-630 am   Sleeps 7 hours a night   She never sleeps through the night, always has  "several awakenings.    Takes quetiapine 25 mg before bed at 1/2 pill before bed  If she takes quetiapine 25 mg she sleeps in longer stretches but awakens feeling more sleepy  Previously had been on clonazepam which did not help    She still has minor episodes of sleepwalking every other night.  This is going on for years.  No injury or risk of leaving the house with these episodes.  They did not improve on clonazepam in the past.  She is weaned herself off of clonazepam over a year ago.          Sitting and reading: Moderate chance of dozing  Watching TV: High chance of dozing  Sitting, inactive in a public place (e.g. a theatre or a meeting): High chance of dozing  As a passenger in a car for an hour without a break: Would never doze  Lying down to rest in the afternoon when circumstances permit: High chance of dozing  Sitting and talking to someone: Would never doze  Sitting quietly after a lunch without alcohol: Would never doze  In a car, while stopped for a few minutes in traffic: Would never doze  Total score: 11       Review of Systems  Pertinent positives/negatives included in HPI and also as noted:         Objective   /72 (BP Location: Left arm, Patient Position: Sitting, Cuff Size: Adult)   Pulse 75   Ht 5' 4\" (1.626 m)   Wt 58.1 kg (128 lb)   BMI 21.97 kg/m²        Physical Exam  Visit Vitals  /72 (BP Location: Left arm, Patient Position: Sitting, Cuff Size: Adult)   Pulse 75   Ht 5' 4\" (1.626 m)   Wt 58.1 kg (128 lb)   BMI 21.97 kg/m²   Smoking Status Never   BSA 1.62 m²                                 Appearance : no distress, alert, cooperative  Mental Status. Memory, and Affect : alert and oriented, normal affect, makes good eye contact, provides a detailed history  Cardiac- : regular rate and rhythm, S1, S2 normal, no murmur, click, rub or gallop  Pulmonary : clear to auscultation bilaterally        Data  No results found for: \"HGB\", \"HCT\", \"MCV\"   No results found for: \"GLUCOSE\", " "\"CALCIUM\", \"NA\", \"K\", \"CO2\", \"CL\", \"BUN\", \"CREATININE\"  No results found for: \"IRON\", \"TIBC\", \"FERRITIN\"  No results found for: \"AST\", \"ALT\"          "

## 2024-11-14 NOTE — ASSESSMENT & PLAN NOTE
-Stable on her current regimen, Adderall is effective  I agree with limiting use of Adderall, if possible.  As she does not reliably need the afternoon dose, we will reduce the number of pills to 235 every 3 months  -No recent  -I reviewed PDMP, there are no concerning entries.  I sent a refill of medication today    Orders:    Ambulatory Referral to Sleep Medicine    amphetamine-dextroamphetamine (ADDERALL, 20MG,) 20 mg tablet; Take 1.5-2 pills in the morning and 0.5-1 pill in the afternoon.  Afternoon dose is as needed

## 2025-04-04 DIAGNOSIS — G47.411 NARCOLEPSY WITH CATAPLEXY: ICD-10-CM

## 2025-04-04 RX ORDER — DEXTROAMPHETAMINE SACCHARATE, AMPHETAMINE ASPARTATE, DEXTROAMPHETAMINE SULFATE AND AMPHETAMINE SULFATE 5; 5; 5; 5 MG/1; MG/1; MG/1; MG/1
TABLET ORAL
Qty: 235 TABLET | Refills: 0 | Status: SHIPPED | OUTPATIENT
Start: 2025-04-04

## 2025-04-04 NOTE — TELEPHONE ENCOUNTER
11/14/2024 11/14/2024 Amphetamine Salt Combo (Tablet) 235.0 78 20 MG NA EMILY ZAMBRANO [facility] Private Pay 0 / 0 PA   [id number] 08/07/2024 08/07/2024 Amphetamine Salt Combo (Tablet) 270.0 90 20 MG NA EMILY ZAMBRANO [facility]

## 2025-05-15 ENCOUNTER — OFFICE VISIT (OUTPATIENT)
Dept: SLEEP CENTER | Facility: CLINIC | Age: 60
End: 2025-05-15
Payer: COMMERCIAL

## 2025-05-15 VITALS
BODY MASS INDEX: 21.68 KG/M2 | DIASTOLIC BLOOD PRESSURE: 70 MMHG | WEIGHT: 127 LBS | SYSTOLIC BLOOD PRESSURE: 112 MMHG | HEIGHT: 64 IN

## 2025-05-15 DIAGNOSIS — G47.411 NARCOLEPSY WITH CATAPLEXY: Primary | ICD-10-CM

## 2025-05-15 DIAGNOSIS — F51.3 SLEEPWALKING (SOMNAMBULISM): ICD-10-CM

## 2025-05-15 DIAGNOSIS — G47.8 SLEEP RELATED EATING DISORDER: ICD-10-CM

## 2025-05-15 PROCEDURE — 99214 OFFICE O/P EST MOD 30 MIN: CPT | Performed by: PSYCHIATRY & NEUROLOGY

## 2025-05-15 NOTE — PROGRESS NOTES
Name: Anjali Rolon      : 1965      MRN: [id number]  Encounter Provider: Ryder Cordova MD  Encounter Date: 5/15/2025   Encounter department: [facility]  :  Assessment & Plan  Narcolepsy with cataplexy  -Doing well on a stable dose of Adderall.  She received a refill in April so is not due for refill today.  She notes her pharmacy may change in the future so we will contact me if this is needed.  -Seems like the afternoon dose is not essentially needed so we will likely plan for further reductions in quantity if she is only taking this once daily       Sleep related eating disorder  -She has a long history of sleepwalking, on further discussion seems like she has sleep-related eating disorder as she typically will awaken to eat in the middle of the night  - She is aware safety precautions with regard to this  - She has been putting a baby gate at the top of the stairs, advised that is not completely safe as it potentially could lead to falling down the stairs so would not recommend that  -We discussed that topiramate can be quite helpful in this condition she wishes to think about this  - Consideration with topiramate however would be the prospect of weight loss.  Her BMI is 21 so we would need to be mindful if she had further weight loss  - Follow-up with me will be in 6 months however sooner if we instead decide to prescribe topiramate.  For this medication, likely we can plan for virtual visits she would only need an in person for Adderall  - Would likely need to stop quetiapine and switch to topiramate, will try to avoid polypharmacy         Sleepwalking (somnambulism)           Assessment & Plan        History of Present Illness   History of Present Illness  Anjali returns in a follow-up for history of narcolepsy with cataplexy.  She been treated with a stable dose of Adderall.  At her last visit we discussed that she was not always taking the afternoon dose of Adderall and  "therefore the total number of pills in a prescription was reduced to 235 pills every 3 months.    Since I last saw she has been taking the afternoon dose of Adderall almost not at all.      Goes to bed  pm  Up 6-630 am  on a worknight. Weekends sleeps until 730-8 am   Sleeps 7 hours a night   Takes quetiapine 12.5 mg at night       No dozing unless at home in evening    Takes Adderall 40 mg at 6:30 AM   No side effects from Adderall, no headaches, no an    Has nightly episodes of sleepwalking, always has eating associated with these.  This has been going on for many years.  In the past she was on clonazepam.  Has never eating anything unusual in the middle of the night, she has some conscious that this is occurring.  Has not had any injuries from sleepwalking             Sitting and reading: Slight chance of dozing  Watching TV: Slight chance of dozing  Sitting, inactive in a public place (e.g. a theatre or a meeting): Would never doze  As a passenger in a car for an hour without a break: Moderate chance of dozing  Lying down to rest in the afternoon when circumstances permit: High chance of dozing  Sitting and talking to someone: Would never doze  Sitting quietly after a lunch without alcohol: Would never doze  In a car, while stopped for a few minutes in traffic: Would never doze  Total score: 7     Review of Systems  Pertinent positives/negatives included in HPI and also as noted:       Objective   /70 (BP Location: Left arm, Patient Position: Sitting, Cuff Size: Adult)   Ht 5' 4\" (1.626 m)   Wt 57.6 kg (127 lb)   BMI 21.80 kg/m²        Physical Exam  Physical Exam    Visit Vitals  /70 (BP Location: Left arm, Patient Position: Sitting, Cuff Size: Adult)   Ht 5' 4\" (1.626 m)   Wt 57.6 kg (127 lb)   BMI 21.80 kg/m²   Smoking Status Never   BSA 1.61 m²                                               Results    Data  No results found for: \"HGB\", \"HCT\", \"MCV\"   No results found for: \"GLUCOSE\", " "\"CALCIUM\", \"NA\", \"K\", \"CO2\", \"CL\", \"BUN\", \"CREATININE\"  No results found for: \"IRON\", \"TIBC\", \"FERRITIN\"  No results found for: \"AST\", \"ALT\"      "

## 2025-05-15 NOTE — ASSESSMENT & PLAN NOTE
-Doing well on a stable dose of Adderall.  She received a refill in April so is not due for refill today.  She notes her pharmacy may change in the future so we will contact me if this is needed.  -Seems like the afternoon dose is not essentially needed so we will likely plan for further reductions in quantity if she is only taking this once daily

## 2025-05-15 NOTE — PATIENT INSTRUCTIONS
Topiramate is the medicine that may help with sleep related eating disorder-  https://pubmed.ncbi.nlm.nih.gov/11291198/

## 2025-05-23 ENCOUNTER — PATIENT MESSAGE (OUTPATIENT)
Dept: SLEEP CENTER | Facility: CLINIC | Age: 60
End: 2025-05-23

## 2025-05-27 NOTE — PATIENT INSTRUCTIONS
Adult Patient Counseling Points     Topiramate   What is this drug used for?   It is used to treat seizures.  It is used to prevent migraine headaches.  It may be given to you for other reasons. Talk with the doctor.  All drugs may cause side effects. However, many people have no side effects or only have minor side effects. Call your doctor or get medical help if any of these side effects or any other side effects bother you or do not go away:   Upset stomach  Throwing up  Constipation  Weight loss  Change in taste  Diarrhea  Stomach pain  Anxiety  Feeling sleepy, tired or weak  Common cold symptoms  Joint pain  Headache  Flushing  WARNING/CAUTION: Even though it may be rare, some people may have very bad and sometimes deadly side effects when taking a drug. Tell your doctor or get medical help right away if you have any of the following signs or symptoms that may be related to a very bad side effect:   Infection  Acidosis like confusion, fast breathing, fast heartbeat, abnormal heartbeat, severe stomach pain, upset stomach, throwing up, fatigue, shortness of breath, or feeling tired or weak  Depression like thoughts of suicide, anxiety, agitation, irritability, panic attacks, mood changes, behavioral changes, or confusion  High ammonia levels like abnormal heartbeat, abnormal breathing, confusion, pale skin, slow heartbeat, seizures, sweating, throwing up, or twitching  Liver problems like dark urine, feeling tired, not hungry, upset stomach or stomach pain, light-colored stools, throwing up, or yellow skin or eyes  Cedeno-Rajan syndrome/toxic epidermal necrolysis like red, swollen, blistered, or peeling skin (with or without fever); red or irritated eyes; or sores in mouth, throat, nose, or eyes  Kidney stone like back pain, stomach pain, or blood in the urine  Bruising  Bleeding  Confusion  Trouble focusing  Behavioral changes  Change in balance  Severe dizziness  Passing out  Inability to eat  Burning or  numbness feeling  Bone pain  Chest pain  Trouble with memory  Muscle pain  Muscle weakness  Trouble speaking  Trouble sleeping  Tremors  Trouble walking  Involuntary eye movements  Blurred vision  Eye redness  Vision changes  Eye pain  Signs of an allergic reaction, like rash; hives; itching; red, swollen, blistered, or peeling skin with or without fever; wheezing; tightness in the chest or throat; trouble breathing, swallowing, or talking; unusual hoarseness; or swelling of the mouth, face, lips, tongue, or throat.  Note: This is not a comprehensive list of all side effects. Talk to your doctor if you have questions.  Consumer Information Use and Disclaimer: This information should not be used to decide whether or not to take this medicine or any other medicine. Only the healthcare provider has the knowledge and training to decide which medicines are right for a specific patient. This information does not endorse any medicine as safe, effective, or approved for treating any patient or health condition. This is only a limited summary of general information about the medicine’s uses from the patient education leaflet and is not intended to be comprehensive. This limited summary does NOT include all information available about the possible uses, directions, warnings, precautions, interactions, adverse effects, or risks that may apply to this medicine. This information is not intended to provide medical advice, diagnosis or treatment and does not replace information you receive from the healthcare provider. For a more detailed summary of information about the risks and benefits of using this medicine, please speak with your healthcare provider and review the entire patient education leaflet.  Copyright   © 2024 [employer] and its affiliates and/or licensors. All rights reserved.  Last Reviewed Date   2023-06-21

## 2025-05-28 ENCOUNTER — PATIENT MESSAGE (OUTPATIENT)
Dept: SLEEP CENTER | Facility: CLINIC | Age: 60
End: 2025-05-28

## 2025-05-28 DIAGNOSIS — G47.8 SLEEP RELATED EATING DISORDER: Primary | ICD-10-CM

## 2025-05-29 RX ORDER — TOPIRAMATE SPINKLE 25 MG/1
CAPSULE ORAL
Qty: 60 CAPSULE | Refills: 1 | Status: SHIPPED | OUTPATIENT
Start: 2025-05-29

## 2025-06-05 ENCOUNTER — PATIENT MESSAGE (OUTPATIENT)
Dept: SLEEP CENTER | Facility: CLINIC | Age: 60
End: 2025-06-05

## 2025-07-21 DIAGNOSIS — G47.411 NARCOLEPSY WITH CATAPLEXY: ICD-10-CM

## 2025-07-21 RX ORDER — DEXTROAMPHETAMINE SACCHARATE, AMPHETAMINE ASPARTATE, DEXTROAMPHETAMINE SULFATE AND AMPHETAMINE SULFATE 5; 5; 5; 5 MG/1; MG/1; MG/1; MG/1
TABLET ORAL
Qty: 215 TABLET | Refills: 0 | Status: SHIPPED | OUTPATIENT
Start: 2025-07-21

## 2025-07-21 NOTE — TELEPHONE ENCOUNTER
Per [employer] message:  Hi Dr. Cordova,  I am going to need my prescription for adderall refilled now and I have a new prescription plan with my insurance as of July 1. I have a feeling this might not be an easy fill. It might need a prior authorization or to be filled at the specialty pharmacy so I want to get started. I have uploaded my new insurance cards both (medical and pharmacy) to the portal. It says they need to be reviewed.  I can also fax them to your office and will look for the fax number or will have to call on Monday if needed. Please keep me updated as to what I need to do on my end to have a smooth transition with this new pharmacy coverage.  I appreciate you time.  Thank Anjali kolb  -------------------------------------    Last office visit 5/15/2025.  Next office visit 11/20/2025.    Dr. Cordova, please review Rx request and sign if appropriate.  Thank you.

## 2025-08-05 DIAGNOSIS — G47.8 SLEEP RELATED EATING DISORDER: ICD-10-CM

## 2025-08-06 RX ORDER — TOPIRAMATE SPINKLE 25 MG/1
CAPSULE ORAL
Qty: 60 CAPSULE | Refills: 1 | Status: SHIPPED | OUTPATIENT
Start: 2025-08-06